# Patient Record
Sex: MALE | Race: WHITE | NOT HISPANIC OR LATINO | Employment: FULL TIME | URBAN - METROPOLITAN AREA
[De-identification: names, ages, dates, MRNs, and addresses within clinical notes are randomized per-mention and may not be internally consistent; named-entity substitution may affect disease eponyms.]

---

## 2020-06-07 ENCOUNTER — HOSPITAL ENCOUNTER (EMERGENCY)
Age: 30
Discharge: HOME | End: 2020-06-07
Attending: EMERGENCY MEDICINE

## 2020-06-07 ENCOUNTER — APPOINTMENT (OUTPATIENT)
Dept: GENERAL RADIOLOGY | Age: 30
End: 2020-06-07

## 2020-06-07 VITALS
BODY MASS INDEX: 30.46 KG/M2 | TEMPERATURE: 98.6 F | SYSTOLIC BLOOD PRESSURE: 129 MMHG | HEIGHT: 71 IN | OXYGEN SATURATION: 100 % | RESPIRATION RATE: 16 BRPM | WEIGHT: 217.59 LBS | DIASTOLIC BLOOD PRESSURE: 81 MMHG | HEART RATE: 74 BPM

## 2020-06-07 DIAGNOSIS — S69.91XA INJURY OF RIGHT THUMB, INITIAL ENCOUNTER: Primary | ICD-10-CM

## 2020-06-07 PROCEDURE — 73130 X-RAY EXAM OF HAND: CPT

## 2020-06-07 PROCEDURE — 73110 X-RAY EXAM OF WRIST: CPT

## 2020-06-07 PROCEDURE — 29125 APPL SHORT ARM SPLINT STATIC: CPT

## 2020-06-07 PROCEDURE — 10002803 HB RX 637

## 2020-06-07 PROCEDURE — 99283 EMERGENCY DEPT VISIT LOW MDM: CPT

## 2020-06-07 RX ORDER — IBUPROFEN 200 MG
TABLET ORAL
Status: COMPLETED
Start: 2020-06-07 | End: 2020-06-07

## 2020-06-07 RX ORDER — IBUPROFEN 400 MG/1
400 TABLET ORAL ONCE
Status: COMPLETED | OUTPATIENT
Start: 2020-06-07 | End: 2020-06-07

## 2020-06-07 RX ORDER — ESCITALOPRAM OXALATE 10 MG/1
10 TABLET ORAL DAILY
COMMUNITY

## 2020-06-07 RX ADMIN — IBUPROFEN 400 MG: 200 TABLET, FILM COATED ORAL at 19:15

## 2020-06-07 RX ADMIN — IBUPROFEN 400 MG: 400 TABLET ORAL at 19:15

## 2020-06-07 ASSESSMENT — ENCOUNTER SYMPTOMS
FEVER: 0
ABDOMINAL PAIN: 0
SHORTNESS OF BREATH: 0
BACK PAIN: 0
WEAKNESS: 0
NERVOUS/ANXIOUS: 0

## 2024-08-26 ENCOUNTER — OFFICE VISIT (OUTPATIENT)
Dept: FAMILY MEDICINE CLINIC | Facility: CLINIC | Age: 34
End: 2024-08-26
Payer: COMMERCIAL

## 2024-08-26 VITALS
OXYGEN SATURATION: 98 % | TEMPERATURE: 98 F | HEART RATE: 68 BPM | HEIGHT: 71 IN | BODY MASS INDEX: 29.35 KG/M2 | SYSTOLIC BLOOD PRESSURE: 116 MMHG | WEIGHT: 209.63 LBS | DIASTOLIC BLOOD PRESSURE: 75 MMHG

## 2024-08-26 DIAGNOSIS — Z00.00 ENCOUNTER FOR ANNUAL PHYSICAL EXAM: ICD-10-CM

## 2024-08-26 DIAGNOSIS — L98.9 SKIN LESION: ICD-10-CM

## 2024-08-26 DIAGNOSIS — Z76.89 ENCOUNTER TO ESTABLISH CARE: Primary | ICD-10-CM

## 2024-08-26 RX ORDER — TIMOLOL MALEATE 5 MG/ML
SOLUTION/ DROPS OPHTHALMIC
COMMUNITY
Start: 2024-06-13 | End: 2024-08-26

## 2024-08-26 RX ORDER — MUPIROCIN 20 MG/G
OINTMENT TOPICAL
COMMUNITY
Start: 2023-12-15 | End: 2024-08-26

## 2024-08-26 RX ORDER — ALPRAZOLAM 0.25 MG
TABLET ORAL
COMMUNITY
Start: 2023-12-15 | End: 2024-08-26

## 2024-08-26 RX ORDER — ESCITALOPRAM OXALATE 10 MG/1
10 TABLET ORAL DAILY
COMMUNITY
End: 2024-08-26

## 2024-08-26 RX ORDER — CETIRIZINE HYDROCHLORIDE 10 MG/1
TABLET ORAL
COMMUNITY
Start: 2024-06-13 | End: 2024-08-26

## 2024-08-26 RX ORDER — NYSTATIN 100000 U/G
CREAM TOPICAL
COMMUNITY
Start: 2024-08-13

## 2024-08-26 RX ORDER — SULFAMETHOXAZOLE/TRIMETHOPRIM 800-160 MG
1 TABLET ORAL 2 TIMES DAILY
COMMUNITY
End: 2024-08-26

## 2024-08-26 RX ORDER — IVERMECTIN 3 MG/1
TABLET ORAL
COMMUNITY
Start: 2024-08-13

## 2024-08-26 NOTE — PROGRESS NOTES
Shan Whittington is a 34 year old male.  Chief Complaint   Patient presents with    Establish Care     Need to establish care and would like a dermatology referral, and medication questions     HPI:   Shan Whittington presented to the clinic for annual physical examination. New patient. Looking to establish care. Normal Sleep. Normal appetite. Balanced diet. Normal BM/Urination. Physically active.     Patient with new onset patient testicle and testicular pain x 8 months. Worse with intercourse. [facility]. No improvement in testicular lesion. Worsening. Associated pain with intercourse. Has tried mupriocin, nystatin, and timolol with no improvement. Was advised next steps would be invermectin vs burn vs surgical removal.  Interested in second opinion.        Current Outpatient Medications   Medication Sig Dispense Refill    nystatin 100,000 Units/g External Cream       Ivermectin 3 MG Oral Tab  (Patient not taking: Reported on 8/26/2024)        Past Medical History:    Anxiety    Bipolar 2 disorder (HCC)      Past Surgical History:   Procedure Laterality Date    Foreign body removal      [id number] and after a few more surgeries      Social History:  Social History     Socioeconomic History    Marital status: Single   Tobacco Use    Smoking status: Former     Types: Cigarettes    Smokeless tobacco: Never   Substance and Sexual Activity    Alcohol use: Not Currently    Drug use: Never    Sexual activity: Not Currently   Other Topics Concern     Service Yes    Blood Transfusions No    Caffeine Concern No    Occupational Exposure No    Hobby Hazards No    Sleep Concern No    Stress Concern No    Weight Concern No    Special Diet No    Back Care No    Exercise No    Bike Helmet No    Seat Belt No    Self-Exams No      Family History   Problem Relation Age of Onset    Diabetes Mother     Breast Cancer Mother     Breast Cancer Maternal Grandmother     Diabetes Maternal  Grandmother       Allergies   Allergen Reactions    Mupirocin RASH        REVIEW OF SYSTEMS:   Review of Systems   Constitutional:  Negative for activity change.   HENT: Negative.     Eyes: Negative.    Respiratory:  Negative for chest tightness and shortness of breath.    Cardiovascular:  Negative for chest pain and palpitations.   Gastrointestinal:  Negative for abdominal pain, constipation and diarrhea.   Genitourinary: Negative.  Negative for difficulty urinating.   Musculoskeletal: Negative.    Skin:  Positive for wound.   Neurological: Negative.  Negative for dizziness and headaches.   Psychiatric/Behavioral: Negative.     All other systems reviewed and are negative.     Wt Readings from Last 5 Encounters:   08/26/24 209 lb 9.6 oz (95.1 kg)   03/03/08 187 lb (84.8 kg) (85%, Z= 1.06)*   07/31/07 193 lb (87.5 kg) (90%, Z= 1.28)*     * Growth percentiles are based on [facility] (Boys, 2-20 Years) data.     Body mass index is 29.23 kg/m².      EXAM:   /75 (BP Location: Left arm, Patient Position: Sitting)   Pulse 68   Temp 98.1 °F (36.7 °C)   Ht 5' 11\" (1.803 m)   Wt 209 lb 9.6 oz (95.1 kg)   SpO2 98%   BMI 29.23 kg/m²   Physical Exam  Vitals and nursing note reviewed.   Constitutional:       Appearance: Normal appearance.   HENT:      Head: Normocephalic and atraumatic.      Right Ear: Tympanic membrane and external ear normal.      Left Ear: Tympanic membrane and external ear normal.      Mouth/Throat:      Mouth: Mucous membranes are moist.      Pharynx: Oropharynx is clear.   Eyes:      Conjunctiva/sclera: Conjunctivae normal.      Pupils: Pupils are equal, round, and reactive to light.   Cardiovascular:      Rate and Rhythm: Normal rate and regular rhythm.      Pulses: Normal pulses.      Heart sounds: Normal heart sounds.   Pulmonary:      Effort: Pulmonary effort is normal.      Breath sounds: No wheezing.   Abdominal:      General: Abdomen is flat. There is no distension.      Palpations: Abdomen is  soft. There is no mass.      Tenderness: There is no abdominal tenderness. There is no guarding.      Hernia: No hernia is present.   Genitourinary:     Comments: Deferred   Musculoskeletal:         General: Normal range of motion.      Cervical back: Normal range of motion.   Skin:     General: Skin is warm.   Neurological:      General: No focal deficit present.      Mental Status: He is alert and oriented to person, place, and time.   Psychiatric:         Mood and Affect: Mood normal.         Behavior: Behavior normal.            ASSESSMENT AND PLAN:   (Z76.89) Encounter to establish care  (primary encounter diagnosis)  (Z00.00) Encounter for annual physical exam  Plan:   - Immunizations UTD   - tobacco, alcohol, illicit drug use discouraged  - safe sexual practices advised  - Reinforced healthy diet, lifestyle, and exercise.  - Past Medical/Social/Family histories reviewed  - Regular dental visits recommended   - Regular eye exams recommended       (L98.9) Skin lesion  Plan: Derm Referral - In Network  Patient with testicular lesion pain x 8 months.  Has had full labs and STD screening completed recently-patient states unremarkable.  Patient states No improvement in lesion-worsening.  [facility]  Has tried several different medications without improvement.  Has been told next steps ivermectin versus burn versus surgical removal.  Interested in second opinion.  Referral placed to Derm.  Further management pending specialty.    Follow up in 1 year or sooner if needed      The patient indicates understanding of these issues and agrees to the plan.  Chaperone offered to the patient prior to examination    This note was prepared using Dragon Medical voice recognition dictation software. As a result errors may occur. When identified these errors have been corrected. While every attempt is made to correct errors during dictation discrepancies may still exist.

## 2025-02-15 ENCOUNTER — HOSPITAL ENCOUNTER (OUTPATIENT)
Age: 35
Discharge: HOME | End: 2025-02-15
Payer: COMMERCIAL

## 2025-02-15 VITALS
RESPIRATION RATE: 20 BRPM | TEMPERATURE: 98 F | DIASTOLIC BLOOD PRESSURE: 86 MMHG | SYSTOLIC BLOOD PRESSURE: 154 MMHG | HEART RATE: 70 BPM | OXYGEN SATURATION: 98 %

## 2025-02-15 DIAGNOSIS — K14.0 TONGUE INFECTION: Primary | ICD-10-CM

## 2025-02-15 RX ORDER — DEXAMETHASONE SODIUM PHOSPHATE 10 MG/ML
10 INJECTION, SOLUTION INTRAMUSCULAR; INTRAVENOUS ONCE
Status: COMPLETED | OUTPATIENT
Start: 2025-02-15 | End: 2025-02-15

## 2025-02-15 NOTE — ED INITIAL ASSESSMENT (HPI)
Pt was eating tater tots on Thursday and right after, began having pain at the tip of his tongue. Over past few days, pain and swelling of tongue have gotten worse and patient reports he has attempted salt water gargles and ibuprofen with minimal relief.

## 2025-02-15 NOTE — DISCHARGE INSTRUCTIONS
Ibuprofen as needed for pain   Gently salt water rinses  Follow up with your doctor if no improvement

## 2025-02-15 NOTE — ED PROVIDER NOTES
Patient Seen in: [facility]      History   No chief complaint on file.    Stated Complaint: Tongue issue    Subjective:   HPI      34-year-old male, with history of bipolar 2, anxiety, presents to the immediate care with swelling pain and a lump to the tip of his tongue.  He states that he was eating tater tots with hot sauce on Thursday night and bit the tip of his tongue.  Since then he has had worsening pain and swelling with a whitish appearance to the tip of the tongue.  He feels some improvement with ibuprofen.  No difficulty swallowing.  No allergies.  No history of geographical tongue or burning tongue syndrome    Objective:     Past Medical History:    Anxiety    Bipolar 2 disorder (HCC)              Past Surgical History:   Procedure Laterality Date    Foreign body removal      [id number] and after a few more surgeries                Social History     Socioeconomic History    Marital status: Single   Tobacco Use    Smoking status: Former     Types: Cigarettes    Smokeless tobacco: Never   Substance and Sexual Activity    Alcohol use: Not Currently    Drug use: Never    Sexual activity: Not Currently   Other Topics Concern     Service Yes    Blood Transfusions No    Caffeine Concern No    Occupational Exposure No    Hobby Hazards No    Sleep Concern No    Stress Concern No    Weight Concern No    Special Diet No    Back Care No    Exercise No    Bike Helmet No    Seat Belt No    Self-Exams No              Review of Systems    Positive for stated complaint: Tongue issue  Other systems are as noted in HPI.  Constitutional and vital signs reviewed.      All other systems reviewed and negative except as noted above.    Physical Exam     ED Triage Vitals [02/15/25 1045]   /86   Pulse 70   Resp 20   Temp 97.7 °F (36.5 °C)   Temp src Oral   SpO2 98 %   O2 Device None (Room air)       Current Vitals:   Vital Signs  BP: 154/86  Pulse: 70  Resp: 20  Temp: 97.7 °F (36.5 °C)  Temp src:  Oral    Oxygen Therapy  SpO2: 98 %  O2 Device: None (Room air)        Physical Exam  Vitals and nursing note reviewed.   Constitutional:       Appearance: Normal appearance.   HENT:      Nose: No congestion.      Mouth/Throat:        Comments: Small wound to the distal tip of the tongue with surrounding tenderness there is no drainage mild swelling  Cardiovascular:      Rate and Rhythm: Normal rate.      Pulses: Normal pulses.   Pulmonary:      Effort: Pulmonary effort is normal.   Musculoskeletal:      Cervical back: Normal range of motion.   Skin:     General: Skin is warm and dry.             ED Course   Labs Reviewed - No data to display         Abrasion, healing laceration, infection, considered abscess consider geographical tongue burning mouth syndrome taste bud inflammation  Augmentin, decadron ibuprofen fu if no improvement  Return for concerns     MDM              Medical Decision Making  Problems Addressed:  Tongue infection: acute illness or injury    Risk  OTC drugs.  Prescription drug management.        Disposition and Plan     Clinical Impression:  1. Tongue infection         Disposition:  Discharge  2/15/2025 11:09 am    Follow-up:  Brian Ferreira MD  [address] [place] [address]  [phone]    Schedule an appointment as soon as possible for a visit in 2 days  For wound re-check if no improvement          Medications Prescribed:  Discharge Medication List as of 2/15/2025 11:17 AM        START taking these medications    Details   amoxicillin clavulanate 875-125 MG Oral Tab Take 1 tablet by mouth 2 (two) times daily for 7 days., Normal, Disp-14 tablet, R-0                 Supplementary Documentation:

## (undated) NOTE — LETTER
8/26/2024          To Whom It May Concern:    Shan Whittington is currently under my medical care. Please excuse patient for appointment today 8/26/24.     If you require additional information please contact our office.        Sincerely,        DUKE Sprague          Document generated by:  DUKE Sprague